# Patient Record
Sex: MALE | Race: WHITE
[De-identification: names, ages, dates, MRNs, and addresses within clinical notes are randomized per-mention and may not be internally consistent; named-entity substitution may affect disease eponyms.]

---

## 2019-02-05 ENCOUNTER — HOSPITAL ENCOUNTER (OUTPATIENT)
Dept: HOSPITAL 80 - FIMAGING | Age: 44
End: 2019-02-05
Attending: ORTHOPAEDIC SURGERY
Payer: COMMERCIAL

## 2019-02-05 DIAGNOSIS — Z01.818: Primary | ICD-10-CM

## 2019-02-05 DIAGNOSIS — M17.12: ICD-10-CM

## 2019-02-05 DIAGNOSIS — K57.30: ICD-10-CM

## 2019-02-05 DIAGNOSIS — M11.262: ICD-10-CM

## 2019-02-08 ENCOUNTER — HOSPITAL ENCOUNTER (OUTPATIENT)
Dept: HOSPITAL 80 - F3N | Age: 44
Setting detail: OBSERVATION
LOS: 12 days | Discharge: HOME | End: 2019-02-20
Attending: ORTHOPAEDIC SURGERY | Admitting: ORTHOPAEDIC SURGERY
Payer: COMMERCIAL

## 2019-02-08 DIAGNOSIS — J45.909: ICD-10-CM

## 2019-02-08 DIAGNOSIS — F17.210: ICD-10-CM

## 2019-02-08 DIAGNOSIS — I10: ICD-10-CM

## 2019-02-08 DIAGNOSIS — Z87.19: ICD-10-CM

## 2019-02-08 DIAGNOSIS — M17.12: Primary | ICD-10-CM

## 2019-02-08 PROCEDURE — G0378 HOSPITAL OBSERVATION PER HR: HCPCS

## 2019-02-08 PROCEDURE — 73560 X-RAY EXAM OF KNEE 1 OR 2: CPT

## 2019-02-08 PROCEDURE — 27447 TOTAL KNEE ARTHROPLASTY: CPT

## 2019-02-08 PROCEDURE — 97116 GAIT TRAINING THERAPY: CPT

## 2019-02-08 PROCEDURE — 97161 PT EVAL LOW COMPLEX 20 MIN: CPT

## 2019-02-20 VITALS — DIASTOLIC BLOOD PRESSURE: 88 MMHG | SYSTOLIC BLOOD PRESSURE: 126 MMHG

## 2019-02-20 NOTE — SOAPPROG
SOAP Progress Note


Assessment/Plan: 


Assessment:


Patient is doing well day of LTKA 


Pain management: pain is well controlled on oral pain meds.


VTE ppx: recommend 81 mg aspirin morning and evening for 4 weeks, cont ESAU and 

SCDs


Anemia: level is expected initially postop. Asymptomatic. Continue to monitor 


D/c planning:patient has done much better than anticipated.  Patient is stable, 

BP stable, pain well controlled and patient is eager for discharge to home.  

Has been cleared by PT























Plan:





02/20/19 17:44





Subjective: 


No shortness of breath, chest pain, nausea, vomiting.





Objective: 





 Vital Signs











Temp Pulse Resp BP Pulse Ox


 


 36.9 C   66   16   134/88 H  95 


 


 02/20/19 16:26  02/20/19 16:26  02/20/19 16:26  02/20/19 16:26  02/20/19 16:26








 











 02/19/19 02/20/19 02/21/19





 05:59 05:59 05:59


 


Intake Total   820


 


Output Total   930


 


Balance   -110








LLE: incision dressing clean and dry, NVI, positive PF/DF





ICD10 Worksheet


Patient Problems: 


 Problems











Problem Status Onset


 


Primary osteoarthritis of left knee Acute

## 2019-02-20 NOTE — POSTOPPROG
Post Op Note


Date of Operation: 02/20/19


Surgeon: BRIAN Garay


Assistant: Jennifer Garay and Pushpa Roberts PAC


Anesthesiologist: Dr. Lofton


Anesthesia: Spinal, Other (Specify) (adductor canal block)


Pre-op Diagnosis: left knee OA


Post-op Diagnosis: same


Indication: left knee pain


Procedure: left TKA, robot assisted


Findings: severe OA of left knee


Inf/Abcess present in the surg proc area at time of surgery?: No


EBL:

## 2019-02-20 NOTE — GDS
[f rep st]



                                                             DISCHARGE SUMMARY





SUPERVISING PHYSICIAN:  ALANA Garay MD



ADMISSION DIAGNOSIS:  Left knee osteoarthritis.



DISCHARGE DIAGNOSIS:  Left knee osteoarthritis.



PROCEDURE:  Left total knee arthroplasty, robot-assisted.



VTE PROPHYLAXIS:  Recommend aspirin 81 mg twice daily for 4 weeks.



BRIEF DESCRIPTION OF HOSPITAL STAY:  Patient was admitted for an elective joint arthroplasty.  The pa
ozielnt tolerated the procedure well and has passed physical therapy.  The patient was given appropriat
e antibiotic prophylaxis and venous thromboembolism prophylaxis.  The patient's pain was well control
led on oral pain medication, patient was holding down food, and had urinated.  Decision was made to d
ischarge the patient.  The patient was given post-operative prescriptions pre-operatively.



PLAN:  Follow up with Dr. Garay's office on March 14th at 10 a.m.





Job #:  [id number]

## 2019-02-20 NOTE — PDANEPAE
ANE History of Present Illness





left knee OA for TKA





ANE Past Medical History





- Cardiovascular History


Hx Hypertension: Yes


Hx Arrhythmias: No


Hx Chest Pain: No


Hx Coronary Artery / Peripheral Vascular Disease: No


Hx CHF / Valvular Disease: No


Hx Palpitations: No





- Pulmonary History


Hx COPD: No


Hx Asthma/Reactive Airway Disease: No


Hx Recent Upper Respiratory Infection: No


Hx Oxygen in Use at Home: No


Hx Sleep Apnea: No


Sleep Apnea Screening Result - Last Documented: Positive





- Neurologic History


Hx Cerebrovascular Accident: No


Hx Seizures: No


Hx Dementia: No





- Endocrine History


Hx Diabetes: No





- Renal History


Hx Renal Disorders: No





- Liver History


Hx Hepatic Disorders: No





- Neurological & Psychiatric Hx


Hx Neurological and Psychiatric Disorders: No





- Cancer History


Hx Cancer: No





- Congenital Disorder History


Hx Congenital Disorders: No





- GI History


Hx Gastrointestinal Disorders: Yes


Gastrointestinal History Comment: diverticulitis





- Other Health History


Other Health History: none





- Chronic Pain History


Chronic Pain: No





- Surgical History


Prior Surgeries: multiple surgeries on both knees 80'-90's





ANE Review of Systems


Review of systems is: negative


Review of Systems: 








- Exercise capacity


METS (RN): 4 METS





ANE Patient History





- Allergies


Allergies/Adverse Reactions: 








bee venom protein (honey bee) Allergy (Verified 02/07/19 16:15)


 Other-Enter Comments


quinacrine [Quinacrine] Allergy (Verified 02/07/19 16:14)


 Other-Enter Comments








- Home Medications


Home medications: home medication list seen and reviewed


Home Medications: 








Acetaminophen [Tylenol 325mg (*)] 325 mg PO DAILY PRN 02/01/19 [Last Taken 

Unknown]


Olmesartan Medoxomil [Benicar] 40 mg PO DAILY 02/01/19 [Last Taken Unknown]


Tetrahydrozoline 0.05% [Visine (*)] 1 drops OP DAILY PRN 02/01/19 [Last Taken 

Unknown]


celeCOXIB [Celebrex (*)] 200 mg PO DAILY 02/01/19 [Last Taken Unknown]


oxyCODONE IR [Oxycodone Ir (*)] 5 mg PO Q4-6PRN PRN 02/01/19 [Last Taken Unknown

]








- NPO status


NPO Since - Liquids (Date): 02/20/19


NPO Since - Liquids (Time): 07:30


NPO Since - Solids (Date): 02/19/19


NPO Since - Solids (Time): 20:30





- Anes Hx


Anes Hx: no prior problems





- Smoking Hx


Smoking Status: Light smoker





- Family Anes Hx


Family Hx Anesthesia Complications: none





ANE Labs/Vital Signs





- Vital Signs


Blood Pressure: 129/86


Heart Rate: 60


Respiratory Rate: 18


O2 Sat (%): 96


Height: 177.8 cm


Weight: 94.347 kg





ANE Physical Exam





- Airway


Neck exam: FROM


Mallampati Score: Class 1


Mouth exam: normal dental/mouth exam





- Pulmonary


Pulmonary: no respiratory distress





- Cardiovascular


Cardiovascular: regular rate and rhythym





- ASA Status


ASA Status: II





ANE Anesthesia Plan


Anesthesia Plan: spinal


Regional Anesthesia: single shot NB, adductor canal FNB

## 2019-02-26 NOTE — GOP
[f rep st]



                                                                OPERATIVE REPORT





DATE OF OPERATION:  02/20/2019



SURGEON:  ALANA Garay MD



ASSISTANT:  1. FRANK Matson. 

2. RFANK Brown.



ANESTHESIA:  Spinal.



PREOPERATIVE DIAGNOSIS:  Left knee osteoarthritis.



POSTOPERATIVE DIAGNOSIS:  Left knee osteoarthritis.



PROCEDURE PERFORMED:  Left total knee arthroplasty with computer navigation, robotic assist.



FINDINGS:  





ESTIMATED BLOOD LOSS:  30 cc.



INDICATIONS:  The patient is a 43-year-old male with severe and progressive pain and deformity of the
 left knee unresponsive to conservative care.  The risks and benefits of surgical intervention were e
xplained in detail.



DESCRIPTION OF PROCEDURE:  The patient was brought to the operative room and placed on the table in t
he supine position. Spinal anesthesia was induced without difficulty. A pneumatic tourniquet was appl
ied about the left proximal thigh, and the leg was prepped and draped in a sterile fashion. The leg h
older was applied. After exsanguination by elevation the tourniquet was inflated to 250 mmHg. 



Incision was made anterior medial from the tibial tuberosity to a point 2 cm proximal to the superior
 pole of the patella. Medial parapatellar arthrotomy was carried out from the superior pole of the pa
tella and posteriorly in line with the fibers of the Type II VMO. The medial collateral ligament was 
elevated and the infrapatellar fat pad was resected. 



The patella was everted and the articular surface was excised. A 38 mm patellar button was placed.  

Attention was turned first to the distal aspect of the femur.  After exposure of the femur, 2 half pi
ns were placed for fixation of the femoral array.  In a similar fashion, 2 pins were placed anteromed
ial on the tibia for fixation of the tibial array.  External land marking and registration of the hip
 center was performed without difficulty.  Internal femoral and tibial registration was carried out w
ithout difficulty and the femoral and tibial checkpoints were placed and verified for accuracy. 



Attention was turned to the femur.  The foot print for the size 6 femoral component was cut with the 
saw using the [employer] robotic system and verified for accuracy against the CT based plan.  In a similar f
ashion, the saw was used to cut the footprint for the size 6 tibial component using the [employer] system an
d verified for accuracy against the CT based plan. The tibial articular surface was excised without d
ifficulty, followed by the intercondylar box cut. 



The knee was extended and the remnants of the medial and lateral meniscus were excised. The posterior
 capsule was injected with ropivacaine, epinephrine and Toradol.  A size 6 tibial tray was positioned
. Trial reduction was then carried out. There was excellent range of motion, alignment, and stability
 using the 6 x 9 mm polyethylene. 



All trials were then removed. The joint was thoroughly irrigated and carefully dried. The press-fit c
omponents were implanted. The permanent 6 x 9 mm polyethylene was placed without difficulty. 



The tourniquet was deflated and all bleeders were coagulated. The wound was thoroughly irrigated and 
closed using interrupted sutures of 2-0 Vicryl for the joint capsule. The subcu was closed with 3-0 V
icryl and the skin with 4-0 Monocryl.  Dermabond and Steri-Strips were applied followed by a compress
al dressing. The patient was then moved from the operating room to the recovery room in good conditi
on, having tolerated the procedure well.



PATHOLOGY:  Severe tricompartmental osteoarthritis.





Job #:  [id number]